# Patient Record
Sex: FEMALE | Race: WHITE
[De-identification: names, ages, dates, MRNs, and addresses within clinical notes are randomized per-mention and may not be internally consistent; named-entity substitution may affect disease eponyms.]

---

## 2020-05-29 ENCOUNTER — HOSPITAL ENCOUNTER (EMERGENCY)
Dept: HOSPITAL 50 - VM.ED | Age: 60
Discharge: HOME | End: 2020-05-29
Payer: COMMERCIAL

## 2020-05-29 DIAGNOSIS — Z88.0: ICD-10-CM

## 2020-05-29 DIAGNOSIS — Z79.899: ICD-10-CM

## 2020-05-29 DIAGNOSIS — H81.10: Primary | ICD-10-CM

## 2020-05-29 DIAGNOSIS — Z88.2: ICD-10-CM

## 2020-05-29 LAB
ANION GAP SERPL CALC-SCNC: 12.1 MMOL/L (ref 10–20)
CHLORIDE SERPL-SCNC: 103 MMOL/L (ref 98–107)
SODIUM SERPL-SCNC: 142 MMOL/L (ref 136–145)

## 2020-05-29 RX ADMIN — ONDANSETRON ONE PACKET: 4 TABLET, ORALLY DISINTEGRATING ORAL at 23:22

## 2020-05-29 RX ADMIN — ONDANSETRON ONE MG: 4 TABLET, ORALLY DISINTEGRATING ORAL at 23:15

## 2020-05-29 NOTE — EDM.PDOC
ED HPI GENERAL MEDICAL PROBLEM





- General


Chief Complaint: General


Stated Complaint: ?vertigo


Time Seen by Provider: 05/29/20 22:19


Source of Information: Reports: Patient





- History of Present Illness


INITIAL COMMENTS - FREE TEXT/NARRATIVE: 





Mary is a 60 y/o female who comes to the ER with a 1 week history of 

intermittent dizziness. She has been traveling on the road [facility] at at first she was noticing the things spinning around her and then she 

occasionally reports that she feels spinning. She is better if she is lying 

down and not moving. Never had this before. No nausea or vomiting.





  ** Neck


Pain Score (Numeric/FACES): 2





- Related Data


 Allergies











Allergy/AdvReac Type Severity Reaction Status Date / Time


 


Penicillins Allergy  Hives Verified 05/29/20 22:16


 


Sulfa (Sulfonamide Allergy  Hives Verified 05/29/20 22:16





Antibiotics)     











Home Meds: 


 Home Meds





Budesonide [Pulmicort Flexhaler] 1 inhalation INH BID 05/29/20 [History]


DULoxetine HCl [Cymbalta] 60 mg PO DAILY 05/29/20 [History]


Doxylamine Succinate [Sleep Aid] 25 mg PO BEDTIME PRN 05/29/20 [History]


RX: Diclofenac Sodium 75 mg PO BID 05/29/20 [History]


RX: Montelukast Sodium 10 mg PO DAILY 05/29/20 [History]


RX: methocarbamoL [Methocarbamol] 750 mg PO QID PRN 05/29/20 [History]


buPROPion [buPROPion XL] 150 mg PO DAILY 05/29/20 [History]











ED ROS GENERAL





- Review of Systems


Review Of Systems: See Below


Constitutional: Reports: No Symptoms


HEENT: Reports: Vertigo


Respiratory: Reports: No Symptoms


Cardiovascular: Reports: No Symptoms


Endocrine: Reports: No Symptoms


GI/Abdominal: Reports: No Symptoms


: Reports: No Symptoms


Musculoskeletal: Reports: No Symptoms


Skin: Reports: No Symptoms


Neurological: Reports: Dizziness


Psychiatric: Reports: No Symptoms


Hematologic/Lymphatic: Reports: No Symptoms


Immunologic: Reports: No Symptoms





ED EXAM, GENERAL





- Physical Exam


Exam: See Below


Exam Limited By: No Limitations


General Appearance: Alert, WD/WN, No Apparent Distress (adult female)


Eye Exam: Bilateral Eye: Nystagmus (left sided nystagmus), PERRL


Ears: Normal External Exam, Hearing Grossly Normal, Normal TMs


Nose: Normal Inspection, No Blood


Throat/Mouth: Normal Inspection, Normal Lips, Normal Teeth, Normal Oropharynx, 

Normal Voice


Neck: Normal Inspection, Supple, Non-Tender


Respiratory/Chest: No Respiratory Distress, Lungs Clear, Normal Breath Sounds, 

Chest Non-Tender


Cardiovascular: Normal Peripheral Pulses, Regular Rate, Rhythm


GI/Abdominal: Normal Bowel Sounds, Soft, Non-Tender


 (Female) Exam: Deferred


Rectal (Female) Exam: Deferred


Back Exam: Normal Inspection


Extremities: Normal Inspection, Normal Range of Motion, Non-Tender, No Pedal 

Edema, Normal Capillary Refill


Neurological: Alert, Oriented, CN II-XII Intact, Normal Cognition


Psychiatric: Normal Affect, Normal Mood


Skin Exam: Warm, Dry, Intact, Normal Color, No Rash


Lymphatic: No Adenopathy





Course





- Vital Signs


Text/Narrative:: 





The patient was seen by the CNP. Labs done.





2305 Lab reviewed. No abnormals. Left side Epley Maneuver done at this time and 

patient feeling better when in upright position. She was placed in a soft 

collar to keep her looking forward. She was given Zofran ODT 4mg and observed.





2325 Patient remained stable in the ER and her sx were minimal. She was given 

discharge instructions ans sent home in stable condition.








Last Recorded V/S: 


 Last Vital Signs











Temp  36.6 C   05/29/20 22:00


 


Pulse  88   05/29/20 22:00


 


Resp  18   05/29/20 22:00


 


BP  169/89 H  05/29/20 22:00


 


Pulse Ox  94 L  05/29/20 22:00














- Orders/Labs/Meds


Labs: 


 Laboratory Tests











  05/29/20 05/29/20 Range/Units





  22:47 22:47 


 


WBC  6.4   (4.0-10.0)  x10^3/uL


 


RBC  4.53   (4.00-5.50)  x10^6/uL


 


Hgb  14.2   (12.0-16.0)  g/dL


 


Hct  42.8   (33.0-47.0)  %


 


MCV  94.5 H   (78.0-93.0)  fL


 


MCH  31.3   (26.0-32.0)  pg


 


MCHC  33.2   (32.0-36.0)  g/dL


 


RDW Coeff of Buddy  12.4   (10.0-15.0)  %


 


Plt Count  253   (130-400)  x10^3/uL


 


Neut % (Auto)  67.9   (50.0-80.0)  %


 


Lymph % (Auto)  19.1 L   (25.0-50.0)  %


 


Mono % (Auto)  9.2   (2.0-11.0)  %


 


Eos % (Auto)  3.0   (0.0-4.0)  %


 


Baso % (Auto)  0.8   (0.2-1.2)  %


 


Sodium   142  (136-145)  mmol/L


 


Potassium   4.1  (3.5-5.1)  mmol/L


 


Chloride   103  ()  mmol/L


 


Carbon Dioxide   31  (21-32)  mmol/L


 


Anion Gap   12.1  (10-20)  mmol/L


 


BUN   15  (7-18)  mg/dL


 


Creatinine   0.9  (0.55-1.02)  mg/dL


 


Est Cr Clr Drug Dosing   TNP  


 


Estimated GFR (MDRD)   > 60  


 


Glucose   152 H  ()  mg/dL


 


Calcium   9.3  (8.5-10.1)  mg/dL


 


Corrected Calcium   9.38  (8.5-10.1)  mg/dL


 


Total Bilirubin   0.5  (0.2-1.0)  mg/dL


 


AST   20  (15-37)  U/L


 


ALT   35  (14-59)  U/L


 


Alkaline Phosphatase   89  ()  U/L


 


Total Protein   7.7  (6.4-8.2)  g/dL


 


Albumin   3.9  (3.4-5.0)  g/dL


 


Globulin   3.8  


 


Albumin/Globulin Ratio   1.03  


 


TSH, Ultra Sensitive   0.667  (0.358-3.74)  uIU/mL











Meds: 


Medications














Discontinued Medications














Generic Name Dose Route Start Last Admin





  Trade Name Freq  PRN Reason Stop Dose Admin


 


Ondansetron HCl  4 mg  05/29/20 23:11  05/29/20 23:15





  Zofran Odt  PO  05/29/20 23:12  4 mg





  ONETIME ONE   Administration





     





     





     





     


 


Ondansetron HCl  2 packet  05/29/20 23:19  05/29/20 23:22





  Take Home: Ondansetron Odt 4 Mg, 2 Tab Pack  PO  05/29/20 23:20  2 packet





  ONETIME ONE   Administration





     





     





     





     














Departure





- Departure


Time of Disposition: 23:20


Disposition: Home, Self-Care 01


Condition: Good


Clinical Impression: 


 Benign paroxysmal positional vertigo








- Discharge Information


*PRESCRIPTION DRUG MONITORING PROGRAM REVIEWED*: No


*COPY OF PRESCRIPTION DRUG MONITORING REPORT IN PATIENT SAMUEL: No


Instructions:  How to Perform the Epley Maneuver, Benign Positional Vertigo


Referrals: 


PCP,Not In Area [Primary Care Provider] - 


Forms:  ED Department Discharge


Additional Instructions: 


-Ondanestron ODT 4mg oral every 6 hours as needed for nausea #4 (Take Home Rx)





-Continue to wear the soft collar for 24 hours. 





-Sleep on 2 pillows tonight to keep your head elevated.





-Epley Maneuver instructions are attached, buy you can also find various 

[employer] videos with visual explanations.





-If your symptoms are not better, sometimes a Physical Therapy referral will 

help.





-Return to the ER if needed fro any concerns.


 





Sepsis Event Note





- Evaluation


Sepsis Screening Result: No Definite Risk





- Focused Exam


Vital Signs: 


 Vital Signs











  Temp Pulse Resp BP Pulse Ox


 


 05/29/20 22:00  36.6 C  88  18  169/89 H  94 L











Date Exam was Performed: 05/29/20


Time Exam was Performed: 23:31